# Patient Record
Sex: MALE | Race: BLACK OR AFRICAN AMERICAN | Employment: FULL TIME | URBAN - METROPOLITAN AREA
[De-identification: names, ages, dates, MRNs, and addresses within clinical notes are randomized per-mention and may not be internally consistent; named-entity substitution may affect disease eponyms.]

---

## 2019-01-04 ENCOUNTER — HOSPITAL ENCOUNTER (EMERGENCY)
Facility: CLINIC | Age: 39
Discharge: HOME | End: 2019-01-04
Admitting: PHYSICIAN ASSISTANT
Payer: COMMERCIAL

## 2019-01-04 ENCOUNTER — APPOINTMENT (OUTPATIENT)
Dept: CT IMAGING | Facility: CLINIC | Age: 39
End: 2019-01-04
Payer: COMMERCIAL

## 2019-01-04 VITALS
DIASTOLIC BLOOD PRESSURE: 79 MMHG | RESPIRATION RATE: 16 BRPM | TEMPERATURE: 97.9 F | OXYGEN SATURATION: 98 % | SYSTOLIC BLOOD PRESSURE: 144 MMHG | HEART RATE: 75 BPM | WEIGHT: 189 LBS | HEIGHT: 66 IN | BODY MASS INDEX: 30.37 KG/M2
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
## 2019-01-04 DIAGNOSIS — S09.90XA CLOSED HEAD INJURY, INITIAL ENCOUNTER: ICD-10-CM

## 2019-01-04 DIAGNOSIS — R51.9 NONINTRACTABLE HEADACHE, UNSPECIFIED CHRONICITY PATTERN, UNSPECIFIED HEADACHE TYPE: ICD-10-CM

## 2019-01-04 PROCEDURE — 70450 CT HEAD/BRAIN W/O DYE: CPT

## 2019-01-04 PROCEDURE — 72125 CT NECK SPINE W/O DYE: CPT

## 2019-01-04 PROCEDURE — 99284 EMERGENCY DEPT VISIT MOD MDM: CPT | Mod: 25

## 2019-01-04 ASSESSMENT — ENCOUNTER SYMPTOMS
VOMITING: 1
NAUSEA: 1
FEVER: 0
CONFUSION: 0
BACK PAIN: 0
PHOTOPHOBIA: 1
NUMBNESS: 0
NECK PAIN: 1
HEADACHES: 1
CHILLS: 0
WEAKNESS: 0

## 2019-01-04 ASSESSMENT — MIFFLIN-ST. JEOR: SCORE: 1715.05

## 2019-01-04 NOTE — LETTER
January 4, 2019      To Whom It May Concern:      Reji Loaiza was seen in our Emergency Department today, 01/04/19. He is unable to work for the next 3 days. He will be seen again on Monday for recheck and will return to work when cleared/improved.     Sincerely,        ANGELA Kohli

## 2019-01-04 NOTE — ED TRIAGE NOTES
Patient slipped on ice in driveway last night and hit the back of his head.  No LOC.  Since then has had headache and one episode of vomiting.  Sensitive to light.  PERRLA.      Took Tylenol last night at 1100.      ABCs intact.  Alert and oriented x 3.

## 2019-01-04 NOTE — DISCHARGE INSTRUCTIONS
Take Tylenol and Profen as needed at home for headache control brain rest for the next few days.  Return to the ED for any changing or worsening symptoms, weakness, speech difficulty, confusion, gait abnormality, increased lethargy, new concerns.  Follow-up with primary next week for recheck.    Discharge Instructions  Head Injury    You have been seen today for a head injury. You were checked for serious problems, like bleeding on the brain, but these problems cannot always be found right away.  Due to this risk, you should not be alone for 24 hours after your injury.  Follow up with your regular physician in 3-5 days. If you are taking a blood thinner, such as aspirin, Pradaxa  (dabigatran), Coumadin  (warfarin), or Plavix  (clopidogrel), you are at especially high risk for immediate or delayed bleeding, and need to re-check with a physician in 24 hours, or sooner if any of the symptoms below happen.     Return to the Emergency Department if:  You are confused, have amnesia, or you are not acting right.  Your headache gets worse or you start to have a really bad headache even with your recommended treatment plan.  You vomit more than once.  You have a convulsion or seizure.  You have trouble walking.  You have weakness or paralysis in an arm or a leg.  You have blood or fluid coming from your ears or nose.  You have new symptoms or anything that worries you.    Sleeping:  It is okay for you to sleep, but someone should wake you up as instructed by your doctor, and someone should check on you at your usual time to wake up.     Activity:  Do not drive for at least 24 hours.  Do not drive if you have dizzy spells or trouble concentrating, or remembering things.  Do not return to any contact sports until cleared by your regular doctor.     Follow-up:  It is very important that you make an appointment with your clinic and go to the appointment.  If you do not follow-up with your regular doctor, it may result in missing  an important development which could result in permanent injury or disability and/or lasting pain.  If there is any problem keeping your appointment, call your doctor or return to the Emergency Department.    MORE INFORMATION:    Concussion:  A concussion is a minor head injury that may cause temporary problems with the way your brain works.  Some symptoms include:  confusion, amnesia, nausea and vomiting, dizziness, fatigue, memory or concentration problems, irritability and sleep problems.    CT Scans: Your evaluation today may have included a CT scan (CAT scan) to look for things like bleeding or a skull fracture (break).  CT scans involve radiation and too many CT scans can cause serious health problems like cancer, especially in children.  Because of this, your doctor may not have ordered a CT scan today if they think you are at low risk for a serious or life threatening problem.    If you were given a prescription for medicine here today, be sure to read all of the information (including the package insert) that comes with your prescription.  This will include important information about the medicine, its side effects, and any warnings that you need to know about.  The pharmacist who fills the prescription can provide more information and answer questions you may have about the medicine.  If you have questions or concerns that the pharmacist cannot address, please call or return to the Emergency Department.     Opioid Medication Information    Pain medications are among the most commonly prescribed medicines, so we are including this information for all our patients. If you did not receive pain medication or get a prescription for pain medicine, you can ignore it.     You may have been given a prescription for an opioid (narcotic) pain medicine and/or have received a pain medicine while here in the Emergency Department. These medicines can make you drowsy or impaired. You must not drive, operate dangerous  equipment, or engage in any other dangerous activities while taking these medications. If you drive while taking these medications, you could be arrested for DUI, or driving under the influence. Do not drink any alcohol while you are taking these medications.     Opioid pain medications can cause addiction. If you have a history of chemical dependency of any type, you are at a higher risk of becoming addicted to pain medications.  Only take these prescribed medications to treat your pain when all other options have been tried. Take it for as short a time and as few doses as possible. Store your pain pills in a secure place, as they are frequently stolen and provide a dangerous opportunity for children or visitors in your house to start abusing these powerful medications. We will not replace any lost or stolen medicine.  As soon as your pain is better, you should flush all your remaining medication.     Many prescription pain medications contain Tylenol  (acetaminophen), including Vicodin , Tylenol #3 , Norco , Lortab , and Percocet .  You should not take any extra pills of Tylenol  if you are using these prescription medications or you can get very sick.  Do not ever take more than 3000 mg of acetaminophen in any 24 hour period.    All opioids tend to cause constipation. Drink plenty of water and eat foods that have a lot of fiber, such as fruits, vegetables, prune juice, apple juice and high fiber cereal.  Take a laxative if you don?t move your bowels at least every other day. Miralax , Milk of Magnesia, Colace , or Senna  can be used to keep you regular.      Remember that you can always come back to the Emergency Department if you are not able to see your regular doctor in the amount of time listed above, if you get any new symptoms, or if there is anything that worries you.

## 2019-01-04 NOTE — ED PROVIDER NOTES
History     Chief Complaint:  Head Injury    LENY Loaiza is a 38 year old male who presents with fall and head injury. The patient states that yesterday afternoon he was walking down his driveway when he suffered a slip and fall on ice. He describes falling backwards and hitting the back of his head and neck on the ground. There was no loss of consciousness and the patient was able to get up and walk without issue. However, he had an initially mild posterior headache. That night, the patient felt nauseous and woke up in the middle of the night and vomited once, but otherwise has not had any further bouts. He continued to have a posterior headache, neck pain, nausea, and photophobia and went to Urgent Care today. Once there, the patient was subsequently referred to the ED for potential imaging giving his symptoms. Currently, the patient complains of nausea and a 6/10 posterior headache and neck pain. He otherwise denies vision changes, further vomiting, numbness/weakness/tingling, confusion.  He has not had any recent illness or other injury.    Allergies:  No known drug allergies     Medications:    The patient is currently on no regular medications.     Past Medical History:    The patient does not have any past pertinent medical history.     Past Surgical History:    Right ankle fusion     Family History:    History reviewed. No pertinent family history.      Social History:  Smoking Status: never Smoker  Alcohol Use: No  Patient presents with wife.   Marital Status:        Review of Systems   Constitutional: Negative for chills and fever.   Eyes: Positive for photophobia. Negative for visual disturbance.   Gastrointestinal: Positive for nausea and vomiting.   Musculoskeletal: Positive for neck pain. Negative for back pain.   Neurological: Positive for headaches. Negative for syncope, weakness and numbness.   Psychiatric/Behavioral: Negative for confusion.   All other systems reviewed and are  "negative.    Physical Exam     Patient Vitals for the past 24 hrs:   BP Temp Temp src Pulse Resp SpO2 Height Weight   01/04/19 1127 144/79 97.9  F (36.6  C) Temporal 75 16 98 % 1.676 m (5' 6\") 85.7 kg (189 lb)     Physical Exam  General: Well appearing, well nourished. Normal mood and affect.  Skin: Good turgor, no rash, no unusual bruising or prominent lesions.  HEENT: Head: Normocephalic, no visible or palpable edema or masses.  Tenderness palpation throughout the posterior scalp.  No evidence of abrasion or lacerations.  No raccoon eyes or burton sign.  Eyes: Conjunctiva clear. PERRL. EOMs intact.  Ears: EACs clear, TMs translucent.   Nose: No septal hematoma.  Throat/pharynx: Mucous membranes moist.  Neck: Supple, without lymphadenopathy.  Cardiac: Normal rate and regular rhythm, no murmur or gallop.   Lungs: Clear to auscultation.  Abdomen: Abdomen soft, nontender.  Musculoskeletal: Tenderness palpation throughout the posterior scalp.  mild tenderness throughout the cervical spine.  Full range of motion without significant discomfort.  No tenderness palpation throughout the thoracic or lumbar spine.  Full strength in upper and lower extremities.  Normal gait and station. No other tenderness throughout extremities.   Neurologic: Oriented x 3. GCS: 15.  Sensation intact throughout upper and lower extremities.  Normal finger to nose and rapid hand movements.  Psychiatric: Intact recent and remote memory, judgment and insight, normal mood and affect.     Emergency Department Course     Imaging:  Radiographic findings were communicated with the patient who voiced understanding of the findings.    CT-scan Head w/o contrast:  Normal CT scan of the head.  Result per radiology.      CT-scan Cervical spine w/o contrast:  1. No evidence of acute trauma.  2. Degenerative changes.  As read by Radiology.      Emergency Department Course:  Past medical records, nursing notes, and vitals reviewed.  1157: I performed an exam of " the patient and obtained history, as documented above. GCS 15.     The patient was sent for a CT-scan while in the emergency department, findings above.      1256: I rechecked the patient. Findings and plan explained to the Patient. Patient discharged home with instructions regarding supportive care, medications, and reasons to return. The importance of close follow-up was reviewed.      Impression & Plan    Medical Decision Making:  Reji Loaiza is a 38 year old male who presents for evaluation after fall with trauma to the head. Details of the patent's history can be noted in the HPI. Upon my exam, the patient appeared well and was neurologically intact. No signs of open wound, no visual or palpable deformities or edema. Exam most consistent with head contusion.  The patient's head to toe trauma exam was otherwise negative for any other serious injuries or disease processes of the head, neck, chest, abdomen, extremities, pelvis.  Full range of motion of neck without discomfort. No cervical, thoracic, spinal tenderness. No signs of laceration. He has a history and clinical exam consistent with contusion to head. Less likely concussion given scenario but discussed with the patient. However, due to the patient's mechanism of injury, nausea/vomiting, head CT was completed per Danish Head CT rules. The differential diagnosis includes skull fracture, epidural hematoma, subdural hematoma, intracerebral hemorrhage, and traumatic subarachnoid hemorrhage; all of these are highly unlikely in this clinical setting. CT imaging results returned without acute pathology.    The patient's will return to the ED for any red flag signs, including change in behavior, severe headache, drowsiness, seizures, vomiting, etc.  Home precautions and symptomatic care discussed.  We also discussed second impact syndrome in the instance in which the patient would have a concussion.  The patient will be rechecked by their primary care provider  within the next 2-3 days.  All questions were answered by the patient's discharge.  The patient was in agreement with the treatment plan as stated above.    Diagnosis:    ICD-10-CM    1. Closed head injury, initial encounter S09.90XA    2. Nonintractable headache, unspecified chronicity pattern, unspecified headache type R51    3. Fall, initial encounter W19.XXXA        Disposition:  discharged to home    Rubens Abad  1/4/2019   [facility] EMERGENCY DEPARTMENT  IRubens am serving as a scribe at 11:57 AM on 1/4/2019 to document services personally performed by Sarah Alexis PA-C based on my observations and the provider's statements to me.      This was created at least in part with a voice recognition software. Mistakes/typos may be present.     Sarah Alexis PA  01/04/19 1422

## 2019-01-04 NOTE — ED AVS SNAPSHOT
[facility] Emergency Department  [address]  [facility] [address]  Phone:  [phone]  Fax:  [phone]                                    Reji Loaiza   MRN: [id number]    Department:  [facility] Emergency Department   Date of Visit:  1/4/2019           After Visit Summary Signature Page    I have received my discharge instructions, and my questions have been answered. I have discussed any challenges I see with this plan with the nurse or doctor.    ..........................................................................................................................................  Patient/Patient Representative Signature      ..........................................................................................................................................  Patient Representative Print Name and Relationship to Patient    ..................................................               ................................................  Date                                   Time    ..........................................................................................................................................  Reviewed by Signature/Title    ...................................................              ..............................................  Date                                               Time          22EPIC Rev 08/18

## 2021-02-24 ENCOUNTER — APPOINTMENT (OUTPATIENT)
Dept: URBAN - METROPOLITAN AREA CLINIC 253 | Age: 41
Setting detail: DERMATOLOGY
End: 2021-02-25

## 2021-02-24 VITALS — HEIGHT: 68 IN | WEIGHT: 180 LBS | RESPIRATION RATE: 14 BRPM

## 2021-02-24 DIAGNOSIS — B35.1 TINEA UNGUIUM: ICD-10-CM

## 2021-02-24 DIAGNOSIS — L03.01 CELLULITIS OF FINGER: ICD-10-CM

## 2021-02-24 PROBLEM — L60.9 NAIL DISORDER, UNSPECIFIED: Status: ACTIVE | Noted: 2021-02-24

## 2021-02-24 PROBLEM — L03.011 CELLULITIS OF RIGHT FINGER: Status: ACTIVE | Noted: 2021-02-24

## 2021-02-24 PROCEDURE — OTHER PRESCRIPTION: OTHER

## 2021-02-24 PROCEDURE — OTHER COUNSELING: OTHER

## 2021-02-24 PROCEDURE — OTHER NAIL CLIPPING FOR PATHOLOGY: OTHER

## 2021-02-24 PROCEDURE — 99203 OFFICE O/P NEW LOW 30 MIN: CPT

## 2021-02-24 RX ORDER — FLUOCINONIDE 0.5 MG/G
0.05% OINTMENT TOPICAL BID
Qty: 1 | Refills: 1 | Status: ERX | COMMUNITY
Start: 2021-02-24

## 2021-02-24 ASSESSMENT — LOCATION ZONE DERM: LOCATION ZONE: FINGERNAIL

## 2021-02-24 ASSESSMENT — LOCATION DETAILED DESCRIPTION DERM: LOCATION DETAILED: RIGHT INDEX FINGERNAIL

## 2021-02-24 ASSESSMENT — LOCATION SIMPLE DESCRIPTION DERM: LOCATION SIMPLE: RIGHT INDEX FINGER

## 2021-09-29 ENCOUNTER — LAB REQUISITION (OUTPATIENT)
Dept: LAB | Facility: CLINIC | Age: 41
End: 2021-09-29
Payer: COMMERCIAL

## 2021-09-29 DIAGNOSIS — L60.3 NAIL DYSTROPHY: ICD-10-CM

## 2021-09-29 LAB
ALT SERPL W P-5'-P-CCNC: 62 U/L (ref 0–70)
AST SERPL W P-5'-P-CCNC: 29 U/L (ref 0–45)
BASOPHILS # BLD AUTO: 0 10E3/UL (ref 0–0.2)
BASOPHILS NFR BLD AUTO: 1 %
EOSINOPHIL # BLD AUTO: 0.2 10E3/UL (ref 0–0.7)
EOSINOPHIL NFR BLD AUTO: 3 %
ERYTHROCYTE [DISTWIDTH] IN BLOOD BY AUTOMATED COUNT: 12.4 % (ref 10–15)
HCT VFR BLD AUTO: 45.9 % (ref 40–53)
HGB BLD-MCNC: 15.9 G/DL (ref 13.3–17.7)
IMM GRANULOCYTES # BLD: 0 10E3/UL
IMM GRANULOCYTES NFR BLD: 1 %
LYMPHOCYTES # BLD AUTO: 2.4 10E3/UL (ref 0.8–5.3)
LYMPHOCYTES NFR BLD AUTO: 36 %
MCH RBC QN AUTO: 30.4 PG (ref 26.5–33)
MCHC RBC AUTO-ENTMCNC: 34.6 G/DL (ref 31.5–36.5)
MCV RBC AUTO: 88 FL (ref 78–100)
MONOCYTES # BLD AUTO: 0.5 10E3/UL (ref 0–1.3)
MONOCYTES NFR BLD AUTO: 8 %
NEUTROPHILS # BLD AUTO: 3.5 10E3/UL (ref 1.6–8.3)
NEUTROPHILS NFR BLD AUTO: 51 %
NRBC # BLD AUTO: 0 10E3/UL
NRBC BLD AUTO-RTO: 0 /100
PLAT MORPH BLD: ABNORMAL
PLATELET # BLD AUTO: 200 10E3/UL (ref 150–450)
RBC # BLD AUTO: 5.23 10E6/UL (ref 4.4–5.9)
RBC MORPH BLD: ABNORMAL
WBC # BLD AUTO: 6.6 10E3/UL (ref 4–11)

## 2021-09-29 PROCEDURE — 85025 COMPLETE CBC W/AUTO DIFF WBC: CPT | Mod: ORL | Performed by: DERMATOLOGY

## 2021-09-29 PROCEDURE — 36415 COLL VENOUS BLD VENIPUNCTURE: CPT | Mod: ORL | Performed by: DERMATOLOGY

## 2021-09-29 PROCEDURE — 84460 ALANINE AMINO (ALT) (SGPT): CPT | Mod: ORL | Performed by: DERMATOLOGY

## 2021-09-29 PROCEDURE — 84450 TRANSFERASE (AST) (SGOT): CPT | Mod: ORL | Performed by: DERMATOLOGY

## 2023-03-23 ENCOUNTER — APPOINTMENT (OUTPATIENT)
Dept: GENERAL RADIOLOGY | Facility: CLINIC | Age: 43
End: 2023-03-23
Attending: EMERGENCY MEDICINE
Payer: COMMERCIAL

## 2023-03-23 ENCOUNTER — HOSPITAL ENCOUNTER (EMERGENCY)
Facility: CLINIC | Age: 43
Discharge: HOME | End: 2023-03-23
Attending: EMERGENCY MEDICINE | Admitting: EMERGENCY MEDICINE
Payer: COMMERCIAL

## 2023-03-23 ENCOUNTER — TRANSFERRED RECORDS (OUTPATIENT)
Dept: HEALTH INFORMATION MANAGEMENT | Facility: CLINIC | Age: 43
End: 2023-03-23

## 2023-03-23 VITALS
WEIGHT: 182 LBS | HEART RATE: 68 BPM | TEMPERATURE: 98.9 F | BODY MASS INDEX: 29.25 KG/M2 | HEIGHT: 66 IN | OXYGEN SATURATION: 98 % | DIASTOLIC BLOOD PRESSURE: 89 MMHG | RESPIRATION RATE: 18 BRPM | SYSTOLIC BLOOD PRESSURE: 128 MMHG

## 2023-03-23 DIAGNOSIS — R07.9 CHEST PAIN, UNSPECIFIED TYPE: ICD-10-CM

## 2023-03-23 LAB
ANION GAP SERPL CALCULATED.3IONS-SCNC: 9 MMOL/L (ref 7–15)
BASOPHILS # BLD AUTO: 0 10E3/UL (ref 0–0.2)
BASOPHILS NFR BLD AUTO: 1 %
BUN SERPL-MCNC: 15.7 MG/DL (ref 6–20)
CALCIUM SERPL-MCNC: 9.1 MG/DL (ref 8.6–10)
CHLORIDE SERPL-SCNC: 104 MMOL/L (ref 98–107)
CREAT SERPL-MCNC: 0.9 MG/DL (ref 0.67–1.17)
DEPRECATED HCO3 PLAS-SCNC: 27 MMOL/L (ref 22–29)
EOSINOPHIL # BLD AUTO: 0.1 10E3/UL (ref 0–0.7)
EOSINOPHIL NFR BLD AUTO: 1 %
ERYTHROCYTE [DISTWIDTH] IN BLOOD BY AUTOMATED COUNT: 12.8 % (ref 10–15)
GFR SERPL CREATININE-BSD FRML MDRD: >90 ML/MIN/1.73M2
GLUCOSE SERPL-MCNC: 98 MG/DL (ref 70–99)
HCT VFR BLD AUTO: 45.6 % (ref 40–53)
HGB BLD-MCNC: 15.9 G/DL (ref 13.3–17.7)
HOLD SPECIMEN: NORMAL
HOLD SPECIMEN: NORMAL
IMM GRANULOCYTES # BLD: 0 10E3/UL
IMM GRANULOCYTES NFR BLD: 1 %
LYMPHOCYTES # BLD AUTO: 2.4 10E3/UL (ref 0.8–5.3)
LYMPHOCYTES NFR BLD AUTO: 38 %
MCH RBC QN AUTO: 30.6 PG (ref 26.5–33)
MCHC RBC AUTO-ENTMCNC: 34.9 G/DL (ref 31.5–36.5)
MCV RBC AUTO: 88 FL (ref 78–100)
MONOCYTES # BLD AUTO: 0.5 10E3/UL (ref 0–1.3)
MONOCYTES NFR BLD AUTO: 8 %
NEUTROPHILS # BLD AUTO: 3.3 10E3/UL (ref 1.6–8.3)
NEUTROPHILS NFR BLD AUTO: 51 %
NRBC # BLD AUTO: 0 10E3/UL
NRBC BLD AUTO-RTO: 0 /100
PLATELET # BLD AUTO: 188 10E3/UL (ref 150–450)
POTASSIUM SERPL-SCNC: 4 MMOL/L (ref 3.4–5.3)
RBC # BLD AUTO: 5.2 10E6/UL (ref 4.4–5.9)
SODIUM SERPL-SCNC: 140 MMOL/L (ref 136–145)
TROPONIN T SERPL HS-MCNC: <6 NG/L
WBC # BLD AUTO: 6.3 10E3/UL (ref 4–11)

## 2023-03-23 PROCEDURE — 82310 ASSAY OF CALCIUM: CPT | Performed by: EMERGENCY MEDICINE

## 2023-03-23 PROCEDURE — 84484 ASSAY OF TROPONIN QUANT: CPT | Performed by: EMERGENCY MEDICINE

## 2023-03-23 PROCEDURE — 36415 COLL VENOUS BLD VENIPUNCTURE: CPT | Performed by: EMERGENCY MEDICINE

## 2023-03-23 PROCEDURE — 99285 EMERGENCY DEPT VISIT HI MDM: CPT | Mod: 25

## 2023-03-23 PROCEDURE — 85025 COMPLETE CBC W/AUTO DIFF WBC: CPT | Performed by: EMERGENCY MEDICINE

## 2023-03-23 PROCEDURE — 80048 BASIC METABOLIC PNL TOTAL CA: CPT | Performed by: EMERGENCY MEDICINE

## 2023-03-23 PROCEDURE — 71046 X-RAY EXAM CHEST 2 VIEWS: CPT

## 2023-03-23 ASSESSMENT — ACTIVITIES OF DAILY LIVING (ADL): ADLS_ACUITY_SCORE: 33

## 2023-03-23 NOTE — ED PROVIDER NOTES
"    History     Chief Complaint:  Chest Pain       HPI   Reji Loaiza is a 42 year old male who presents for evaluation of chest pain.  Patient notes that he has had intermittent chest pain for the last 2 months, increasing in frequency.  Typically wakes up with the pain 1-2 nights per week with radiation to the left arm.  He describes it as sharp and lasting several seconds.  It is not associated with deep breath, shortness of breath, fevers, cough, or other concerns.  He denies any radiation to the back or abdomen.    Independent Historian: none    Chest x-ray no pneumothorax on outside 10 times today  Review of External Notes: Reviewed urgent care note from today    ROS:  Review of Systems    Allergies:  Pork Allergy     Medications:    HYDROcodone-acetaminophen (NORCO) 5-325 MG per tablet  ibuprofen (ADVIL,MOTRIN) 600 MG tablet  loratadine (CLARITIN) 10 MG tablet  tetrahydrozoline 0.05 % ophthalmic solution        Past Medical History:    No past medical history on file.    Past Surgical History:    Past Surgical History:   Procedure Laterality Date     ORTHOPEDIC SURGERY Right 2005    ankle fusion        Family History:    family history is not on file.    Social History:   reports that he has never smoked. He does not have any smokeless tobacco history on file. He reports that he does not drink alcohol and does not use drugs.  PCP: [facility], [place]     Physical Exam     Patient Vitals for the past 24 hrs:   BP Temp Temp src Pulse Resp SpO2 Height Weight   03/23/23 1239 (!) 136/91 98.9  F (37.2  C) Oral 65 18 99 % 1.676 m (5' 6\") 82.6 kg (182 lb)        Physical Exam  Constitutional: Alert, attentive  HENT:    Nose: Nose normal.    Mouth/Throat: Oropharynx is clear, mucous membranes are moist   Eyes: EOM are normal.   CV: regular rate and rhythm; no murmurs, rubs or gallups  Chest: Effort normal and breath sounds normal.   GI:  There is no tenderness. No distension. Normal bowel sounds  MSK: Normal range " of motion.   Neurological: Alert, attentive  Skin: Skin is warm and dry.      Emergency Department Course   ECG  Normal sinus rhythm, no ST/T abnormality, rate 68. No significant change from 8/28/16    Imaging:  Chest XR,  PA & LAT   Final Result   IMPRESSION: Negative chest. Lungs clear.      SANTY ISAAC MD            SYSTEM ID:  [id number]      Echo Stress Echocardiogram    (Results Pending)     Report per radiology    Laboratory:  Labs Ordered and Resulted from Time of ED Arrival to Time of ED Departure   BASIC METABOLIC PANEL - Normal       Result Value    Sodium 140      Potassium 4.0      Chloride 104      Carbon Dioxide (CO2) 27      Anion Gap 9      Urea Nitrogen 15.7      Creatinine 0.90      Calcium 9.1      Glucose 98      GFR Estimate >90     TROPONIN T, HIGH SENSITIVITY - Normal    Troponin T, High Sensitivity <6     CBC WITH PLATELETS AND DIFFERENTIAL    WBC Count 6.3      RBC Count 5.20      Hemoglobin 15.9      Hematocrit 45.6      MCV 88      MCH 30.6      MCHC 34.9      RDW 12.8      Platelet Count 188      % Neutrophils 51      % Lymphocytes 38      % Monocytes 8      % Eosinophils 1      % Basophils 1      % Immature Granulocytes 1      NRBCs per 100 WBC 0      Absolute Neutrophils 3.3      Absolute Lymphocytes 2.4      Absolute Monocytes 0.5      Absolute Eosinophils 0.1      Absolute Basophils 0.0      Absolute Immature Granulocytes 0.0      Absolute NRBCs 0.0          Procedures       Emergency Department Course & Assessments:    Independent Interpretation (X-rays, CTs, rhythm strip):  No pneumothorax on chest x-ray    Disposition:  The patient was discharged to home.     Impression & Plan      Medical Decision Making:  This a pleasant 42-year-old male presents for evaluation of chest pain.  Given longstanding and atypical symptoms, his negative EKG and undetectable high-sensitivity troponin essentially rule out AMI.  He is PERC negative, essentially ruling out PE in a patient who is low  "risk by Wells criteria.  Chest x-ray shows no wide mediastinum, pneumothorax, pneumonia.  Discussed\" for symptoms.  Plan outpatient stress echocardiogram and primary care follow-up.  Return precaution for chest pain, shortness of breath, or any other concerns.        Diagnosis:    ICD-10-CM    1. Chest pain, unspecified type  R07.9 Echo Stress Echocardiogram           Discharge Medications:  New Prescriptions    No medications on file              3/23/2023   Avery Stratton MD Houghland, John Eric, MD  03/23/23 1517    "

## 2023-03-23 NOTE — ED TRIAGE NOTES
Pt presents for evaluation of left sided chest pain. Described as a sharp pain, rated 8/10. Radiates to left arm. Has been happening for the last few months, but started to wake pt up at night this week. Intermittent shortness of breath. Went to [facility] , had an EKG and was given 4 baby asa and sent for further evaluation.

## 2023-03-24 LAB
ATRIAL RATE - MUSE: 68 BPM
DIASTOLIC BLOOD PRESSURE - MUSE: NORMAL MMHG
INTERPRETATION ECG - MUSE: NORMAL
P AXIS - MUSE: 62 DEGREES
PR INTERVAL - MUSE: 136 MS
QRS DURATION - MUSE: 82 MS
QT - MUSE: 372 MS
QTC - MUSE: 395 MS
R AXIS - MUSE: 67 DEGREES
SYSTOLIC BLOOD PRESSURE - MUSE: NORMAL MMHG
T AXIS - MUSE: 9 DEGREES
VENTRICULAR RATE- MUSE: 68 BPM

## 2023-04-11 ENCOUNTER — HOSPITAL ENCOUNTER (OUTPATIENT)
Dept: CARDIOLOGY | Facility: CLINIC | Age: 43
Discharge: HOME | End: 2023-04-11
Attending: EMERGENCY MEDICINE | Admitting: EMERGENCY MEDICINE
Payer: COMMERCIAL

## 2023-04-11 DIAGNOSIS — R07.9 CHEST PAIN, UNSPECIFIED TYPE: ICD-10-CM

## 2023-04-11 PROCEDURE — 93321 DOPPLER ECHO F-UP/LMTD STD: CPT | Mod: TC

## 2023-04-11 PROCEDURE — 93325 DOPPLER ECHO COLOR FLOW MAPG: CPT | Mod: TC

## 2023-04-11 PROCEDURE — 255N000002 HC RX 255 OP 636: Performed by: EMERGENCY MEDICINE

## 2023-04-11 PROCEDURE — 93321 DOPPLER ECHO F-UP/LMTD STD: CPT | Mod: 26 | Performed by: INTERNAL MEDICINE

## 2023-04-11 PROCEDURE — 93018 CV STRESS TEST I&R ONLY: CPT | Performed by: INTERNAL MEDICINE

## 2023-04-11 PROCEDURE — 93350 STRESS TTE ONLY: CPT | Mod: 26 | Performed by: INTERNAL MEDICINE

## 2023-04-11 PROCEDURE — 93325 DOPPLER ECHO COLOR FLOW MAPG: CPT | Mod: 26 | Performed by: INTERNAL MEDICINE

## 2023-04-11 PROCEDURE — 93016 CV STRESS TEST SUPVJ ONLY: CPT | Performed by: INTERNAL MEDICINE

## 2023-04-11 RX ADMIN — HUMAN ALBUMIN MICROSPHERES AND PERFLUTREN 6 ML: 10; .22 INJECTION, SOLUTION INTRAVENOUS at 09:35

## 2023-04-15 ENCOUNTER — HEALTH MAINTENANCE LETTER (OUTPATIENT)
Age: 43
End: 2023-04-15

## 2024-06-16 ENCOUNTER — HEALTH MAINTENANCE LETTER (OUTPATIENT)
Age: 44
End: 2024-06-16

## 2025-06-21 ENCOUNTER — HEALTH MAINTENANCE LETTER (OUTPATIENT)
Age: 45
End: 2025-06-21